# Patient Record
(demographics unavailable — no encounter records)

---

## 2025-03-12 NOTE — REVIEW OF SYSTEMS
[Feeling Poorly] : feeling poorly [Feeling Tired] : feeling tired [Cough] : cough [Abdominal Pain] : abdominal pain [Constipation] : constipation [Arthralgias (joint pain)] : arthralgias [Negative] : Heme/Lymph

## 2025-03-14 NOTE — PHYSICAL EXAM
[Alert] : alert [Sclera] : the sclera and conjunctiva were normal [Hearing Threshold Finger Rub Not Ashley] : hearing was normal [Normal Appearance] : the appearance of the neck was normal [No Respiratory Distress] : no respiratory distress [Auscultation Breath Sounds / Voice Sounds] : lungs were clear to auscultation bilaterally [Heart Rate And Rhythm] : heart rate was normal and rhythm regular [Normal S1, S2] : normal S1 and S2 [None] : no edema [Bowel Sounds] : normal bowel sounds [Abdomen Tenderness] : non-tender [No Masses] : no abdominal mass palpated [Abdomen Soft] : soft [Cervical Lymph Nodes Enlarged Anterior Bilaterally] : no anterior cervical lymphadenopathy [Supraclavicular Lymph Nodes Enlarged Bilaterally] : no supraclavicular lymphadenopathy [No CVA Tenderness] : no CVA  tenderness [Abnormal Walk] : normal gait [Normal Turgor] : normal skin turgor [No Focal Deficits] : no focal deficits [Oriented To Time, Place, And Person] : oriented to person, place, and time

## 2025-03-14 NOTE — PHYSICAL EXAM
[Alert] : alert [Sclera] : the sclera and conjunctiva were normal [Hearing Threshold Finger Rub Not Story] : hearing was normal [Normal Appearance] : the appearance of the neck was normal [No Respiratory Distress] : no respiratory distress [Auscultation Breath Sounds / Voice Sounds] : lungs were clear to auscultation bilaterally [Heart Rate And Rhythm] : heart rate was normal and rhythm regular [Normal S1, S2] : normal S1 and S2 [None] : no edema [Bowel Sounds] : normal bowel sounds [Abdomen Tenderness] : non-tender [No Masses] : no abdominal mass palpated [Abdomen Soft] : soft [Cervical Lymph Nodes Enlarged Anterior Bilaterally] : no anterior cervical lymphadenopathy [Supraclavicular Lymph Nodes Enlarged Bilaterally] : no supraclavicular lymphadenopathy [No CVA Tenderness] : no CVA  tenderness [Normal Turgor] : normal skin turgor [Abnormal Walk] : normal gait [No Focal Deficits] : no focal deficits [Oriented To Time, Place, And Person] : oriented to person, place, and time

## 2025-03-14 NOTE — ASSESSMENT
[FreeTextEntry1] : 1.  Encounter for colon cancer screening in average risk patient.  No previous colonoscopy. 2.  Intermittent constipation, likely secondary to medications. 3.  Obesity s/p Lap-Band on Mounjaro. 4.  IDDM. 5.  HTN.  Recs: - Obtain recent labs for review. - Patient was advised to undergo colonoscopy- procedure, risks, benefits, and alternatives were explained. Patient agreeable. Brochure given. PEG prep + extra Dulcolax.  Hold Mounjaro for 7 days prior to procedure.  Speak to prescribing MD about insulin dosing prior to procedure.